# Patient Record
Sex: MALE | Race: WHITE
[De-identification: names, ages, dates, MRNs, and addresses within clinical notes are randomized per-mention and may not be internally consistent; named-entity substitution may affect disease eponyms.]

---

## 2017-11-28 ENCOUNTER — HOSPITAL ENCOUNTER (EMERGENCY)
Dept: HOSPITAL 62 - ER | Age: 67
Discharge: HOME | End: 2017-11-28
Payer: MEDICARE

## 2017-11-28 VITALS — DIASTOLIC BLOOD PRESSURE: 84 MMHG | SYSTOLIC BLOOD PRESSURE: 133 MMHG

## 2017-11-28 DIAGNOSIS — R07.9: Primary | ICD-10-CM

## 2017-11-28 DIAGNOSIS — M79.602: ICD-10-CM

## 2017-11-28 DIAGNOSIS — I10: ICD-10-CM

## 2017-11-28 LAB
ALBUMIN SERPL-MCNC: 4.2 G/DL (ref 3.5–5)
ALP SERPL-CCNC: 63 U/L (ref 38–126)
ALT SERPL-CCNC: 32 U/L (ref 21–72)
ANION GAP SERPL CALC-SCNC: 9 MMOL/L (ref 5–19)
AST SERPL-CCNC: 18 U/L (ref 17–59)
BASOPHILS # BLD AUTO: 0.1 10^3/UL (ref 0–0.2)
BASOPHILS NFR BLD AUTO: 0.9 % (ref 0–2)
BILIRUB DIRECT SERPL-MCNC: 0.3 MG/DL (ref 0–0.4)
BILIRUB SERPL-MCNC: 0.6 MG/DL (ref 0.2–1.3)
BUN SERPL-MCNC: 13 MG/DL (ref 7–20)
CALCIUM: 9.6 MG/DL (ref 8.4–10.2)
CHLORIDE SERPL-SCNC: 102 MMOL/L (ref 98–107)
CO2 SERPL-SCNC: 30 MMOL/L (ref 22–30)
CREAT SERPL-MCNC: 0.91 MG/DL (ref 0.52–1.25)
EOSINOPHIL # BLD AUTO: 0.1 10^3/UL (ref 0–0.6)
EOSINOPHIL NFR BLD AUTO: 1.1 % (ref 0–6)
ERYTHROCYTE [DISTWIDTH] IN BLOOD BY AUTOMATED COUNT: 12.6 % (ref 11.5–14)
GLUCOSE SERPL-MCNC: 104 MG/DL (ref 75–110)
HCT VFR BLD CALC: 43.8 % (ref 37.9–51)
HGB BLD-MCNC: 15.1 G/DL (ref 13.5–17)
HGB HCT DIFFERENCE: 1.5
LYMPHOCYTES # BLD AUTO: 1.6 10^3/UL (ref 0.5–4.7)
LYMPHOCYTES NFR BLD AUTO: 27.6 % (ref 13–45)
MCH RBC QN AUTO: 31.9 PG (ref 27–33.4)
MCHC RBC AUTO-ENTMCNC: 34.4 G/DL (ref 32–36)
MCV RBC AUTO: 93 FL (ref 80–97)
MONOCYTES # BLD AUTO: 0.6 10^3/UL (ref 0.1–1.4)
MONOCYTES NFR BLD AUTO: 9.8 % (ref 3–13)
NEUTROPHILS # BLD AUTO: 3.4 10^3/UL (ref 1.7–8.2)
NEUTS SEG NFR BLD AUTO: 60.6 % (ref 42–78)
POTASSIUM SERPL-SCNC: 4.3 MMOL/L (ref 3.6–5)
PROT SERPL-MCNC: 7 G/DL (ref 6.3–8.2)
RBC # BLD AUTO: 4.72 10^6/UL (ref 4.35–5.55)
SODIUM SERPL-SCNC: 140.6 MMOL/L (ref 137–145)
WBC # BLD AUTO: 5.6 10^3/UL (ref 4–10.5)

## 2017-11-28 PROCEDURE — 93005 ELECTROCARDIOGRAM TRACING: CPT

## 2017-11-28 PROCEDURE — 80053 COMPREHEN METABOLIC PANEL: CPT

## 2017-11-28 PROCEDURE — 36415 COLL VENOUS BLD VENIPUNCTURE: CPT

## 2017-11-28 PROCEDURE — 84484 ASSAY OF TROPONIN QUANT: CPT

## 2017-11-28 PROCEDURE — 85025 COMPLETE CBC W/AUTO DIFF WBC: CPT

## 2017-11-28 PROCEDURE — 93010 ELECTROCARDIOGRAM REPORT: CPT

## 2017-11-28 PROCEDURE — 99285 EMERGENCY DEPT VISIT HI MDM: CPT

## 2017-11-28 NOTE — ER DOCUMENT REPORT
ED Medical Screen (RME)





- General


Chief Complaint: Arm Pain


Stated Complaint: NUMBNESS IN LEFT ARM


Time Seen by Provider: 11/28/17 10:27


Notes: 





Patient states he developed some left arm pain this morning.  He denies any 

chest pain or shortness of breath.  He states it feels better now but is not 

completely abated.  Patient denies any recent trauma.  He states he has no 

history of coronary artery disease.  He says he had a normal stress test 

approximately 5 years ago.





- Related Data


Allergies/Adverse Reactions: 


 





No Known Allergies Allergy (Unverified 11/28/17 09:54)


 








Home Medications: 


 Current Home Medications





Losartan Potassium 25 mg PO DAILY 11/28/17 [History]











Past Medical History





- Social History


Chew tobacco use (# tins/day): No


Frequency of alcohol use: None


Drug Abuse: None


Renal/ Medical History: Denies: Hx Peritoneal Dialysis





Physical Exam





- Vital signs


Vitals: 





 











Temp Pulse Resp BP Pulse Ox


 


 97.9 F   79   16   161/96 H  100 


 


 11/28/17 09:51  11/28/17 09:51  11/28/17 09:51  11/28/17 09:51  11/28/17 09:51














Course





- Vital Signs


Vital signs: 





 











Temp Pulse Resp BP Pulse Ox


 


 97.9 F   79   16   161/96 H  100 


 


 11/28/17 09:51  11/28/17 09:51  11/28/17 09:51  11/28/17 09:51  11/28/17 09:51

## 2017-11-28 NOTE — ER DOCUMENT REPORT
ED Extremity Problem, Upper





- General


Chief Complaint: Arm Pain


Stated Complaint: NUMBNESS IN LEFT ARM


Time Seen by Provider: 11/28/17 10:27


Mode of Arrival: Ambulatory


Information source: Patient


Notes: 





Pt is a 67 year old male with a history of HTN presents to the ER today for 

left arm "aching" that started this morning while he was sitting at home. He 

denies chest pain, sob, nausea, hx of MI or stroke. He had a stress test 6-7 

years ago that was normal. He denies any numbness to the left arm or anywhere 

else. He denies headache/blurred vision. 





- Related Data


Allergies/Adverse Reactions: 


 





No Known Allergies Allergy (Unverified 11/28/17 09:54)


 








Home Medications: 


 Current Home Medications





Losartan Potassium 25 mg PO DAILY 11/28/17 [History]











Past Medical History





- General


Information source: Patient





- Social History


Smoking Status: Never Smoker


Chew tobacco use (# tins/day): No


Frequency of alcohol use: None


Drug Abuse: None


Family History: Reviewed & Not Pertinent


Patient has suicidal ideation: No


Patient has homicidal ideation: No


Renal/ Medical History: Denies: Hx Peritoneal Dialysis





Review of Systems





- Review of Systems


Constitutional: No symptoms reported


EENT: No symptoms reported


Cardiovascular: See HPI


Respiratory: No symptoms reported


Gastrointestinal: No symptoms reported


Genitourinary: No symptoms reported


Male Genitourinary: No symptoms reported


Musculoskeletal: See HPI


Skin: No symptoms reported


Hematologic/Lymphatic: No symptoms reported


Neurological/Psychological: No symptoms reported





Physical Exam





- Vital signs


Vitals: 


 











Temp Pulse Resp BP Pulse Ox


 


 97.9 F   79   16   161/96 H  100 


 


 11/28/17 09:51  11/28/17 09:51  11/28/17 09:51  11/28/17 09:51  11/28/17 09:51














- Notes


Notes: 





PHYSICAL EXAMINATION: 


GENERAL: Well-appearing and in no acute distress. 


HEAD: Atraumatic, normocephalic. 


EYES: Pupils equal round and reactive to light, extraocular movements intact, 

sclera anicteric, conjunctiva are normal. 


ENT: ear canals without erythema or foreign body, TMs pearly grey with good 

bony landmarks, nares patent, oropharynx clear without exudates. Moist mucous 

membranes. 


NECK: Normal range of motion, supple without lymphadenopathy 


LUNGS: CTAB and equal. No wheezes rales or rhonchi. 


HEART: Regular rate and rhythm without murmurs


ABDOMEN: Soft, no tenderness. No guarding, no rebound 


BACK: no vertebral tenderness, normal ROM


GI/: no CVA tenderness


EXTREMITIES: Normal range of motion, no pitting edema. No cyanosis. 


NEUROLOGICAL: Cranial nerves grossly intact. Normal sensory/motor exams. 


PSYCH: Normal mood, normal affect. 


SKIN: Warm, Dry, normal turgor, no rashes or lesions noted 

















Course





- Re-evaluation


Re-evalutation: 





11/28/17 15:29


labwork is unremarkable today with two sets of negative cardiac enzymes, pt is 

chest pain free. Due to pt's risk factors of age, HTN and his clinical story 

today, I did strongly advise admission for stress test but he refused. I did 

give him information for outpatient follow up so that he can get this scheduled 

and he agrees to return with worsening symptoms. 





- Vital Signs


Vital signs: 


 











Temp Pulse Resp BP Pulse Ox


 


 98.3 F   69   16   133/84 H  98 


 


 11/28/17 13:56  11/28/17 13:56  11/28/17 13:56  11/28/17 13:56  11/28/17 13:56














- Laboratory


Result Diagrams: 


 11/28/17 10:28





 11/28/17 10:28





Discharge





- Discharge


Clinical Impression: 


Chest pain


Qualifiers:


 Chest pain type: unspecified Qualified Code(s): R07.9 - Chest pain, unspecified





Condition: Stable


Disposition: HOME, SELF-CARE


Instructions:  Chest Pain of Unclear Cause ([facility])


Additional Instructions: 


Return immediately for any new or worsening symptoms.





Follow up with primary care provider, call tomorrow to make followup 

appointment.


Prescriptions: 


Ibuprofen [Motrin 600 Mg Tablet] 600 mg PO TID #30 tablet


Referrals: 


MELO QUIJANO MD [[facility]] - Follow up as needed

## 2017-11-28 NOTE — EKG REPORT
SEVERITY:- BORDERLINE ECG -

SINUS RHYTHM

ST ELEVATION SUGGESTS NORMAL VARIANT.

:

Confirmed by: Srinivas Trinidad MD 28-Nov-2017 13:39:33

## 2018-10-09 ENCOUNTER — HOSPITAL ENCOUNTER (OUTPATIENT)
Dept: HOSPITAL 62 - RAD | Age: 68
End: 2018-10-09
Attending: FAMILY MEDICINE
Payer: MEDICARE

## 2018-10-09 DIAGNOSIS — R05: ICD-10-CM

## 2018-10-09 DIAGNOSIS — R42: ICD-10-CM

## 2018-10-09 DIAGNOSIS — R06.02: Primary | ICD-10-CM

## 2018-10-09 DIAGNOSIS — R00.0: ICD-10-CM

## 2018-10-09 PROCEDURE — 71046 X-RAY EXAM CHEST 2 VIEWS: CPT

## 2018-10-09 PROCEDURE — 93017 CV STRESS TEST TRACING ONLY: CPT

## 2018-10-09 NOTE — RADIOLOGY REPORT (SQ)
EXAM DESCRIPTION:  CHEST 2 VIEWS



COMPLETED DATE/TIME:  10/9/2018 7:17 am



REASON FOR STUDY:  COUGH (R05), SOB (R06.02), DIZZINESS AND GIDDINESS (R42)



COMPARISON:  None.



EXAM PARAMETERS:  NUMBER OF VIEWS: two views

TECHNIQUE: Digital Frontal and Lateral radiographic views of the chest acquired.

RADIATION DOSE: NA

LIMITATIONS: none



FINDINGS:  LUNGS AND PLEURA: No opacities, masses or pneumothorax. No pleural effusion.  Prominent ni
pple shadows are noted bilaterally.

MEDIASTINUM AND HILAR STRUCTURES: No masses or contour abnormalities.

HEART AND VASCULAR STRUCTURES: Heart normal size.  No evidence for failure.

BONES: No acute findings.

HARDWARE: None in the chest.

OTHER: No other significant finding.



IMPRESSION:  NO ACUTE RADIOGRAPHIC FINDING IN THE CHEST.



TECHNICAL DOCUMENTATION:  JOB ID:  [id number]

 2011 [employer]- All Rights Reserved



Reading location - IP/workstation name: [email or web address]

## 2024-06-12 ENCOUNTER — APPOINTMENT (OUTPATIENT)
Dept: PODIATRY | Facility: CLINIC | Age: 74
End: 2024-06-12
Payer: MEDICARE